# Patient Record
(demographics unavailable — no encounter records)

---

## 2024-10-09 NOTE — IMAGING
[de-identified] : LEFT HAND longitudinal scar over dorsal wrist. skin intact.  no TTP. RF: MPJ extensor lag, IPJ good extension. flex to mid-palm. good EPL, FPL. fingers good extension, flex to mid-palm. good finger abduction and adduction. SILT to median, ulnar, radial distribution. palpable radial pulse, brisk cap refill all digits. no triggering.   RIGHT HAND skin intact.  TTP to thumb IPJ. good EPL, mild limited FPL. fingers good extension, flex to full fist. good finger abduction and adduction.  palpable radial pulse, brisk cap refill all digits. no triggering.   XRAYS OF LEFT HAND (3 views - PA, OBLIQUE, AND LATERAL VIEWS): no acute displaced fracture or dislocation. djd of IPJ of all digits. djd of 2-4 MPJ. djd of thumb CMCJ with small osteophyte. LT coalition. volar tilt of lunate. small triangular metallic foreign body in volar soft tissues of ring finger proximal phalanx. XRAYS OF RIGHT THUMB (3 views - PA, OBLIQUE, AND LATERAL VIEWS): no acute displaced fracture or dislocation. djd of IPJ. djd of thumb CMCJ with small osteophyte.

## 2024-10-09 NOTE — ASSESSMENT
[FreeTextEntry1] : The condition was explained to the patient.  Discussed that arthritis is a progressive degenerative process, and symptoms may have a waxing/waning course, which may be exacerbated by activity or trauma. Discussed increased propensity for stiffness due to underlying arthritis. Recommend OTC pain medication. Do not recommend CSI or surgery unless DM is well controlled.  Recommend f/u with prior surgeon regarding LEFT ring finger extensor lag, possible extensor tendon injury. Given his reported h/o drain placement during surgery, surgery may have been performed for infection, which may have caused adhesions or rupture of tendon. If he elects to transfer care, will bring copy of prior operative report. Patient expressed understanding.  F/u PRN.

## 2024-10-09 NOTE — HISTORY OF PRESENT ILLNESS
[de-identified] : 10/9/24: 55yo male (RHD) presents for: - LEFT ring finger stiffness after dorsal wrist surgery in hospital in [place] ~1 year ago. Reports that the surgery was performed for gout and they "opened the wrist and put a straw inside." - RIGHT thumb pain after slamming dominoes during a game ~2 months ago.  Reports PCP referred him to Neurologist, who referred him to hand specialist.  Hx: NIDDM (HbA1c 7.9 in September 2024). HTN. Gout. [FreeTextEntry5] : MARY FERNANDES fred [RHD] 54 year old male is here today c/o LEFT ring finger stiffness after gout sx 1 year ago. states surgery was done on volar wrist. also c/o RIGHT thumb pain and numbness x 2 months. pt states he recalled symptoms after slamming dominoes during a game.